# Patient Record
Sex: MALE | Race: WHITE | NOT HISPANIC OR LATINO | Employment: OTHER | URBAN - METROPOLITAN AREA
[De-identification: names, ages, dates, MRNs, and addresses within clinical notes are randomized per-mention and may not be internally consistent; named-entity substitution may affect disease eponyms.]

---

## 2018-10-09 ENCOUNTER — OFFICE VISIT (OUTPATIENT)
Dept: PULMONOLOGY | Facility: CLINIC | Age: 73
End: 2018-10-09

## 2018-10-09 ENCOUNTER — DOCUMENTATION (OUTPATIENT)
Dept: OTHER | Facility: HOSPITAL | Age: 73
End: 2018-10-09

## 2018-10-09 VITALS
WEIGHT: 169 LBS | BODY MASS INDEX: 24.2 KG/M2 | HEIGHT: 70 IN | HEART RATE: 60 BPM | OXYGEN SATURATION: 98 % | DIASTOLIC BLOOD PRESSURE: 78 MMHG | TEMPERATURE: 97.4 F | RESPIRATION RATE: 16 BRPM | SYSTOLIC BLOOD PRESSURE: 130 MMHG

## 2018-10-09 DIAGNOSIS — R06.02 SHORTNESS OF BREATH: Primary | ICD-10-CM

## 2018-10-09 DIAGNOSIS — J41.1 MUCOPURULENT CHRONIC BRONCHITIS (HCC): ICD-10-CM

## 2018-10-09 DIAGNOSIS — K21.9 GASTROESOPHAGEAL REFLUX DISEASE, ESOPHAGITIS PRESENCE NOT SPECIFIED: ICD-10-CM

## 2018-10-09 DIAGNOSIS — K44.9 HIATAL HERNIA: ICD-10-CM

## 2018-10-09 DIAGNOSIS — R91.8 MULTIPLE LUNG NODULES ON CT: ICD-10-CM

## 2018-10-09 PROCEDURE — 94726 PLETHYSMOGRAPHY LUNG VOLUMES: CPT | Performed by: INTERNAL MEDICINE

## 2018-10-09 PROCEDURE — 99204 OFFICE O/P NEW MOD 45 MIN: CPT | Performed by: INTERNAL MEDICINE

## 2018-10-09 PROCEDURE — 94729 DIFFUSING CAPACITY: CPT | Performed by: INTERNAL MEDICINE

## 2018-10-09 PROCEDURE — 94060 EVALUATION OF WHEEZING: CPT | Performed by: INTERNAL MEDICINE

## 2018-10-09 RX ORDER — TRIAMTERENE AND HYDROCHLOROTHIAZIDE 37.5; 25 MG/1; MG/1
1 TABLET ORAL DAILY
COMMUNITY
Start: 2018-09-26

## 2018-10-09 RX ORDER — RANITIDINE 150 MG/1
150 TABLET ORAL NIGHTLY
COMMUNITY
Start: 2018-07-11 | End: 2020-08-11 | Stop reason: ALTCHOICE

## 2018-10-09 RX ORDER — ALBUTEROL SULFATE 90 UG/1
4 AEROSOL, METERED RESPIRATORY (INHALATION) ONCE
Status: COMPLETED | OUTPATIENT
Start: 2018-10-09 | End: 2018-10-09

## 2018-10-09 RX ORDER — LOSARTAN POTASSIUM 100 MG/1
100 TABLET ORAL DAILY
COMMUNITY
Start: 2018-08-10

## 2018-10-09 RX ORDER — ALBUTEROL SULFATE 90 UG/1
2 AEROSOL, METERED RESPIRATORY (INHALATION) EVERY 4 HOURS PRN
Qty: 18 G | Refills: 5 | Status: SHIPPED | OUTPATIENT
Start: 2018-10-09 | End: 2020-08-11 | Stop reason: SDUPTHER

## 2018-10-09 RX ADMIN — ALBUTEROL SULFATE 4 PUFF: 90 AEROSOL, METERED RESPIRATORY (INHALATION) at 12:49

## 2018-10-09 NOTE — PROGRESS NOTES
"Initial Pulmonary Consult Note    Subjective   Reason for consultation: Lung nodules.     Williams Atkinson is a 73 y.o. male is being seen for consultation today at the request of Mesha Mchugh MD    History of Present Illness   72 YO here for an abnormal CT of the chest.  He had a physical recently and had a chest x-ray.  The chest x-ray showed a possible \"wire in his heart\" and a subsequent CT scan was done.  He is a former smoker who last quit in 2005.  He previously quit around 1975.  Overall, he estimates he smoked between 22-25 years.  The patient's CT scan showed a large upper lobe cavitary lesion as well as a liver lesion which was felt to be worrisome for metastasis.  Subsequent CT scan with contrast of the abdomen and pelvis revealed this to be consistent with a hemangioma.    The patient reports intermittent coughing which is productive of frothy sputum.  He feels it is related to \"choking on food\" and difficulty swallowing.  He denies fevers, chills, and hemoptysis.  He has a history of a hiatal hernia in the past.  He denies weight loss or night sweats.      The following portions of the patient's history were reviewed and updated as appropriate: allergies, current medications, past family history, past medical history, past social history, past surgical history and problem list.    Active Ambulatory Problems     Diagnosis Date Noted   • Mucopurulent chronic bronchitis (CMS/HCC) 10/09/2018   • Multiple lung nodules on CT 10/09/2018   • Shortness of breath 10/09/2018   • GERD (gastroesophageal reflux disease) 10/09/2018   • Hiatal hernia 10/09/2018     Resolved Ambulatory Problems     Diagnosis Date Noted   • No Resolved Ambulatory Problems     Past Medical History:   Diagnosis Date   • Colon cancer (CMS/HCC) 1998   • GERD (gastroesophageal reflux disease)    • Hiatal hernia    • Hypertension        Past Surgical History:   Procedure Laterality Date   • COLON SURGERY  1998   • HERNIA REPAIR   "       Family History   Problem Relation Age of Onset   • Hypertension Brother        Social History     Social History   • Marital status:      Spouse name: N/A   • Number of children: N/A   • Years of education: N/A     Occupational History   • Not on file.     Social History Main Topics   • Smoking status: Former Smoker     Packs/day: 1.00     Types: Cigars, Cigarettes     Quit date: 2005   • Smokeless tobacco: Never Used   • Alcohol use Yes   • Drug use: No   • Sexual activity: Defer     Other Topics Concern   • Not on file     Social History Narrative   • No narrative on file       No Known Allergies    Current Outpatient Prescriptions   Medication Sig Dispense Refill   • losartan (COZAAR) 100 MG tablet Take 100 mg by mouth Daily.     • raNITIdine (ZANTAC) 150 MG tablet Take 150 mg by mouth Every Night.     • triamterene-hydrochlorothiazide (MAXZIDE-25) 37.5-25 MG per tablet Take 1 tablet by mouth Daily.     • albuterol (PROVENTIL HFA;VENTOLIN HFA) 108 (90 Base) MCG/ACT inhaler Inhale 2 puffs Every 4 (Four) Hours As Needed for Wheezing. 18 g 5   • tiotropium bromide-olodaterol (STIOLTO RESPIMAT) 2.5-2.5 MCG/ACT aerosol solution inhaler Inhale 2 puffs Daily. 2 inh once a day 1 inhaler 5     No current facility-administered medications for this visit.        Review of Systems   Constitutional: Negative for activity change, appetite change, chills, diaphoresis, fatigue, fever and unexpected weight change.   HENT: Positive for hearing loss, postnasal drip, rhinorrhea, tinnitus and trouble swallowing. Negative for congestion, dental problem, ear pain, nosebleeds and sinus pressure.    Eyes: Positive for redness and itching. Negative for pain, discharge and visual disturbance.   Respiratory: Positive for cough, choking and wheezing. Negative for apnea, chest tightness, shortness of breath and stridor.    Cardiovascular: Negative for chest pain, palpitations and leg swelling.   Gastrointestinal: Negative for  "abdominal distention, abdominal pain, blood in stool, constipation, diarrhea, nausea and vomiting.   Endocrine: Positive for polyuria. Negative for cold intolerance, heat intolerance and polydipsia.   Genitourinary: Negative for dysuria, frequency, hematuria and urgency.   Musculoskeletal: Negative for arthralgias, joint swelling and myalgias.   Skin: Positive for rash. Negative for color change and wound.   Allergic/Immunologic: Negative for environmental allergies and immunocompromised state.   Neurological: Positive for dizziness. Negative for syncope, weakness, light-headedness, numbness and headaches.   Psychiatric/Behavioral: Negative for sleep disturbance and suicidal ideas.   All other systems reviewed and are negative.    All other systems were reviewed and are negative.  Exceptions are included in the HPI or as otherwise noted above.     Objective   Blood pressure 130/78, pulse 60, temperature 97.4 °F (36.3 °C), resp. rate 16, height 176.5 cm (69.5\"), weight 76.7 kg (169 lb), SpO2 98 %.  Physical Exam   Constitutional: He is oriented to person, place, and time. He appears well-developed and well-nourished.   HENT:   Head: Normocephalic and atraumatic.   Right Ear: External ear normal.   Left Ear: External ear normal.   Nose: Nose normal.   Mouth/Throat: Oropharynx is clear and moist. No oropharyngeal exudate.   Eyes: Pupils are equal, round, and reactive to light. Conjunctivae and EOM are normal. Right eye exhibits no discharge. Left eye exhibits no discharge. No scleral icterus.   Neck: Normal range of motion. Neck supple. No JVD present. No tracheal deviation present. No thyromegaly present.   Cardiovascular: Normal rate, regular rhythm, normal heart sounds and intact distal pulses.  Exam reveals no gallop and no friction rub.    No murmur heard.  Pulmonary/Chest: Effort normal. No accessory muscle usage or stridor. No apnea, no tachypnea and no bradypnea. No respiratory distress. He has no decreased " breath sounds. He has no wheezes. He has no rhonchi. He has no rales. Chest wall is not dull to percussion. He exhibits no tenderness, no bony tenderness, no crepitus and no deformity.   Abdominal: Soft. Bowel sounds are normal. He exhibits no distension. There is no tenderness.   Musculoskeletal: Normal range of motion. He exhibits no edema, tenderness or deformity.   Lymphadenopathy:     He has no cervical adenopathy.   Neurological: He is alert and oriented to person, place, and time. No cranial nerve deficit. He exhibits normal muscle tone. Coordination normal.   Skin: Skin is warm and dry. No rash noted. No erythema. No pallor.   Psychiatric: He has a normal mood and affect. His behavior is normal. Judgment and thought content normal.   Vitals reviewed.      PFTs:  Full pulmonary function testing was done today.  Please see scanned PFT report for details.    Interpretation: Severe obstruction with significant response to bronchodilator.  No restriction.  Low maximal voluntary ventilation.  No air trapping.  Normal DLCO.  No prior study available for comparison.  This study is consistent with stage II COPD.    Imaging:  CT scan of the chest from 8/16/2018 was reviewed.  It showed a foreign object within the vena cava extending into the right ventricle.  Additionally it showed a small (7.5 mm) left lower lobe nodule, and a small (8 mm) cavitary right upper lobe nodule.  There was a 3.5 cm lesion in the posterior right hepatic lobe of the liver which on subsequent contrast enhanced CT scan was consistent with a hemangioma.    Subsequent CT with contrast of abdomen and pelvis showed an enhancing hepatic lesions likely representing a hemangioma as well as an indeterminate 18 mm right mid pole kidney lesion.      Assessment/Plan   Problems Addressed this Visit        Respiratory    Mucopurulent chronic bronchitis (CMS/HCC)    Relevant Medications    tiotropium bromide-olodaterol (STIOLTO RESPIMAT) 2.5-2.5 MCG/ACT  aerosol solution inhaler    albuterol (PROVENTIL HFA;VENTOLIN HFA) 108 (90 Base) MCG/ACT inhaler    Multiple lung nodules on CT    Relevant Orders    CT Chest Without Contrast    Shortness of breath - Primary    Relevant Medications    albuterol (PROVENTIL HFA;VENTOLIN HFA) inhaler 4 puff (Completed)    Other Relevant Orders    Pulmonary Function Test (Completed)    Hiatal hernia    Relevant Medications    raNITIdine (ZANTAC) 150 MG tablet       Digestive    GERD (gastroesophageal reflux disease)    Relevant Medications    raNITIdine (ZANTAC) 150 MG tablet          Discussion:  73-year-old former smoker who is here for evaluation of lung nodules.  He has 2 separate subcentimeter lung nodules, one of which appears to be cavitary.    Evaluation today reveals moderate COPD.    The patient has a kidney lesion for which additional follow-up was recommended and I will defer this to the provider who ordered the CT scan of the abdomen and pelvis.    I feel the patient would benefit significantly from treatment of his COPD.  He may have an asthmatic component and I may consider addition of an inhaled steroid based on follow-up spirometry and symptoms.    The patient has had difficulty swallowing and should be evaluated by gastroenterology.  The patient desires to follow-up with his prior surgeon, however I will be happy to refer him to gastroenterology if necessary.    Plan:  CT scan in November. - Would follow for stability for 2 years, every 6 months after this initial scan.  Recommend GI follow up.   Start Stiolto.   Ventolin MDI.   Follow-up after repeat scan with spirometry.           I personally spent over half of a total 45 minutes face to face with the patient in counseling and discussion and/or coordination of care as described above.  This included review of images with the patient, discussion of differential diagnosis, discussion of follow-up diagnostic options, treatment options including demonstration of proper  use of inhalers.    Electronically signed by:  Ralf Plaza MD  10/09/18  7:06 PM

## 2018-10-11 NOTE — PROGRESS NOTES
Met patient and wife in lung nodule clinic with Dr. Plaza. He has history of colon cancer treated nearly 20years ago and intermittent smoking during his adult years totaling 23 years. In August he underwent CT chest and found to have 8mm RUL cavitary nodule. He denies hemoptysis, weight loss or new SOA. He did respond well to bronchodilators on PFT's and will be started on maintenance as well as rescue inhalers. Scans and PFT's reviewed. Per Dr. Plaza repeat CT late November with f/u OV same day due to distance he travels to [place]. He will continue to follow GI for colon cancer history. Introduced lung navigator role and provided contact information. He v/u and agreeable to plan. He knows to call with questions or concerns.

## 2018-11-16 ENCOUNTER — TRANSCRIBE ORDERS (OUTPATIENT)
Dept: ADMINISTRATIVE | Facility: HOSPITAL | Age: 73
End: 2018-11-16

## 2018-11-16 DIAGNOSIS — N28.89 RENAL MASS: Primary | ICD-10-CM

## 2018-11-26 ENCOUNTER — APPOINTMENT (OUTPATIENT)
Dept: CT IMAGING | Facility: HOSPITAL | Age: 73
End: 2018-11-26
Attending: INTERNAL MEDICINE

## 2018-12-06 ENCOUNTER — HOSPITAL ENCOUNTER (OUTPATIENT)
Dept: CT IMAGING | Facility: HOSPITAL | Age: 73
Discharge: HOME | End: 2018-12-06

## 2018-12-06 ENCOUNTER — HOSPITAL ENCOUNTER (OUTPATIENT)
Dept: CT IMAGING | Facility: HOSPITAL | Age: 73
Discharge: HOME | End: 2018-12-06
Attending: INTERNAL MEDICINE | Admitting: INTERNAL MEDICINE

## 2018-12-06 ENCOUNTER — OFFICE VISIT (OUTPATIENT)
Dept: PULMONOLOGY | Facility: CLINIC | Age: 73
End: 2018-12-06

## 2018-12-06 VITALS
BODY MASS INDEX: 25.48 KG/M2 | TEMPERATURE: 97.4 F | OXYGEN SATURATION: 96 % | SYSTOLIC BLOOD PRESSURE: 130 MMHG | HEART RATE: 64 BPM | RESPIRATION RATE: 16 BRPM | HEIGHT: 70 IN | DIASTOLIC BLOOD PRESSURE: 74 MMHG | WEIGHT: 178 LBS

## 2018-12-06 DIAGNOSIS — K44.9 HIATAL HERNIA: ICD-10-CM

## 2018-12-06 DIAGNOSIS — R91.8 MULTIPLE LUNG NODULES ON CT: ICD-10-CM

## 2018-12-06 DIAGNOSIS — N28.89 RENAL MASS: ICD-10-CM

## 2018-12-06 DIAGNOSIS — J41.1 MUCOPURULENT CHRONIC BRONCHITIS (HCC): Primary | ICD-10-CM

## 2018-12-06 DIAGNOSIS — R06.02 SHORTNESS OF BREATH: ICD-10-CM

## 2018-12-06 DIAGNOSIS — K21.9 GASTROESOPHAGEAL REFLUX DISEASE, ESOPHAGITIS PRESENCE NOT SPECIFIED: ICD-10-CM

## 2018-12-06 PROCEDURE — 99214 OFFICE O/P EST MOD 30 MIN: CPT | Performed by: INTERNAL MEDICINE

## 2018-12-06 PROCEDURE — 94060 EVALUATION OF WHEEZING: CPT | Performed by: INTERNAL MEDICINE

## 2018-12-06 PROCEDURE — 71250 CT THORAX DX C-: CPT

## 2018-12-06 PROCEDURE — 0 IOPAMIDOL PER 1 ML: Performed by: INTERNAL MEDICINE

## 2018-12-06 PROCEDURE — 82565 ASSAY OF CREATININE: CPT

## 2018-12-06 PROCEDURE — 74170 CT ABD WO CNTRST FLWD CNTRST: CPT

## 2018-12-06 RX ORDER — ALBUTEROL SULFATE 90 UG/1
4 AEROSOL, METERED RESPIRATORY (INHALATION) ONCE
Status: COMPLETED | OUTPATIENT
Start: 2018-12-06 | End: 2018-12-06

## 2018-12-06 RX ADMIN — IOPAMIDOL 95 ML: 755 INJECTION, SOLUTION INTRAVENOUS at 11:30

## 2018-12-06 RX ADMIN — ALBUTEROL SULFATE 4 PUFF: 90 AEROSOL, METERED RESPIRATORY (INHALATION) at 15:42

## 2018-12-06 NOTE — PROGRESS NOTES
"Pulmonary Follow-up Note    Subjective   Reason for consultation: Lung nodules.     Williams Atkinson Sr. is a 73 y.o. male is being seen for consultation today at the request of Mesha Mchugh MD.    History of Present Illness   73 y.o. here for an abnormal CT of the chest.  He had a physical recently and had a chest x-ray.  The chest x-ray showed a possible \"wire in his heart\" and a subsequent CT scan was done.  He is a former smoker who last quit in 2005.  He previously quit around 1975.  Overall, he estimates he smoked between 22-25 years.  The patient's CT scan showed a large upper lobe cavitary lesion as well as a liver lesion which was felt to be worrisome for metastasis.  Subsequent CT scan with contrast of the abdomen and pelvis revealed this to be consistent with a hemangioma.    The patient reports intermittent coughing which is productive of frothy sputum.  He feels it is related to \"choking on food\" and difficulty swallowing.  He denies fevers, chills, and hemoptysis.  He has a history of a hiatal hernia in the past.  He denies weight loss or night sweats.      Interval history:   The patient is here for follow-up today.  He reports he has not had an EGD yet.  He did have a CT scan of the chest, abdomen and pelvis today.  He feels that his breathing is improved on Stiolto.  He does not feel that he has significant allergies and he denies a history of childhood asthma.  No fevers, chills or hemoptysis.    The following portions of the patient's history were reviewed and updated as appropriate: allergies, current medications, past family history, past medical history, past social history, past surgical history and problem list.    No Known Allergies    Current Outpatient Medications   Medication Sig Dispense Refill   • albuterol (PROVENTIL HFA;VENTOLIN HFA) 108 (90 Base) MCG/ACT inhaler Inhale 2 puffs Every 4 (Four) Hours As Needed for Wheezing. 18 g 5   • losartan (COZAAR) 100 MG tablet Take 100 mg by " mouth Daily.     • raNITIdine (ZANTAC) 150 MG tablet Take 150 mg by mouth Every Night.     • tiotropium bromide-olodaterol (STIOLTO RESPIMAT) 2.5-2.5 MCG/ACT aerosol solution inhaler Inhale 2 puffs Daily. 2 inh once a day 1 inhaler 5   • triamterene-hydrochlorothiazide (MAXZIDE-25) 37.5-25 MG per tablet Take 1 tablet by mouth Daily.       No current facility-administered medications for this visit.        Review of Systems   Constitutional: Negative for activity change, appetite change, chills, diaphoresis, fatigue, fever and unexpected weight change.   HENT: Positive for tinnitus and trouble swallowing. Negative for congestion, dental problem, ear pain, nosebleeds, postnasal drip, rhinorrhea and sinus pressure.    Eyes: Positive for itching. Negative for pain, discharge, redness and visual disturbance.   Respiratory: Negative for apnea, cough, choking, chest tightness, shortness of breath, wheezing and stridor.    Cardiovascular: Negative for chest pain, palpitations and leg swelling.   Gastrointestinal: Negative for abdominal distention, abdominal pain, blood in stool, constipation, diarrhea, nausea and vomiting.   Endocrine: Positive for polyuria. Negative for cold intolerance, heat intolerance and polydipsia.   Genitourinary: Negative for dysuria, frequency, hematuria and urgency.   Musculoskeletal: Negative for arthralgias, joint swelling and myalgias.   Skin: Positive for rash. Negative for color change and wound.   Allergic/Immunologic: Negative for environmental allergies and immunocompromised state.   Neurological: Positive for dizziness. Negative for syncope, weakness, light-headedness, numbness and headaches.   Psychiatric/Behavioral: Negative for sleep disturbance and suicidal ideas.   All other systems reviewed and are negative.    All other systems were reviewed and are negative.  Exceptions are included in the HPI or as otherwise noted above.     Objective   Blood pressure 130/74, pulse 64,  "temperature 97.4 °F (36.3 °C), resp. rate 16, height 176.5 cm (69.5\"), weight 80.7 kg (178 lb), SpO2 96 %.  Physical Exam   Constitutional: He is oriented to person, place, and time. He appears well-developed and well-nourished.   HENT:   Head: Normocephalic and atraumatic.   Right Ear: External ear normal.   Left Ear: External ear normal.   Nose: Nose normal.   Mouth/Throat: Oropharynx is clear and moist. No oropharyngeal exudate.   Eyes: Conjunctivae and EOM are normal. Pupils are equal, round, and reactive to light. Right eye exhibits no discharge. Left eye exhibits no discharge. No scleral icterus.   Neck: Normal range of motion. Neck supple. No JVD present. No tracheal deviation present. No thyromegaly present.   Cardiovascular: Normal rate, regular rhythm, normal heart sounds and intact distal pulses. Exam reveals no gallop and no friction rub.   No murmur heard.  Pulmonary/Chest: Effort normal. No accessory muscle usage or stridor. No apnea, no tachypnea and no bradypnea. No respiratory distress. He has no decreased breath sounds. He has no wheezes. He has no rhonchi. He has no rales. Chest wall is not dull to percussion. He exhibits no tenderness, no bony tenderness, no crepitus and no deformity.   Abdominal: Soft. Bowel sounds are normal. He exhibits no distension. There is no tenderness.   Musculoskeletal: Normal range of motion. He exhibits no edema, tenderness or deformity.   Lymphadenopathy:     He has no cervical adenopathy.   Neurological: He is alert and oriented to person, place, and time. No cranial nerve deficit. He exhibits normal muscle tone. Coordination normal.   Skin: Skin is warm and dry. No rash noted. No erythema. No pallor.   Psychiatric: He has a normal mood and affect. His behavior is normal. Judgment and thought content normal.   Vitals reviewed.      PFTs:  Spirometry was done today.  Please see scanned PFT report for details.    Interpretation: Moderate obstruction with no " significant response to bronchodilator.  Low maximal voluntary ventilation.  No evidence of restriction.  Compared to prior study on 10/9/2018, FEV1 on prebronchodilator study is increased by 340 mL on today's study.  Postbronchodilator study showed an FEV1 of 1.56 L.    Imaging:  CT scan of the chest from 12/6/2018 was reviewed.  I reviewed both the images and the radiologist's report.  On my review, there has been no change in the tiny right apical cavitary nodule.  There is some scarring at the left lung base with atelectasis which is also stable.  Radiologist's impression follows:    IMPRESSION:   Stable nodule identified posteriorly within the right upper  lobe. There is also no change seen in the cystic structures within the  kidneys. There is possibly a proteinaceous cyst on the right kidney  which is also stable and unchanged. No new lesions are identified.  Continued followup recommended as indicated. No acute intrathoracic  abnormality is present. Stable atelectatic change is seen at the left  lung base.      CT scan of the chest from 8/16/2018 was reviewed.  It showed a foreign object within the vena cava extending into the right ventricle.  Additionally it showed a small (7.5 mm) left lower lobe nodule, and a small (8 mm) cavitary right upper lobe nodule.  There was a 3.5 cm lesion in the posterior right hepatic lobe of the liver which on subsequent contrast enhanced CT scan was consistent with a hemangioma.      Assessment/Plan   Problems Addressed this Visit        Respiratory    Mucopurulent chronic bronchitis (CMS/HCC) - Primary    Relevant Medications    albuterol (PROVENTIL HFA;VENTOLIN HFA;PROAIR HFA) inhaler 4 puff (Completed)    Other Relevant Orders    Spirometry With Bronchodilator    Spirometry With Bronchodilator (Completed)    Multiple lung nodules on CT    Relevant Orders    CT Chest Without Contrast    Shortness of breath    Hiatal hernia       Digestive    GERD (gastroesophageal reflux  disease)          Discussion:  73 y.o. former smoker who is here for evaluation of lung nodules.  He has 2 separate subcentimeter lung nodules, one of which appears to be cavitary.    Evaluation today reveals moderate COPD.  He previously had a significant bronchodilator response but today has no significant bronchodilator response.    The patient's breathing appears to be improved on Stiolto.  He is not having significant symptoms of asthma currently and had no significant bronchodilator response today.  I am going to continue his Stiolto.      The patient is still awaiting follow-up for his swallowing difficulty.    Plan:  Repeat CT scan without contrast in 6 months. - Would follow for stability for 2 years, every 6 months after this initial scan.  Recommend GI follow up.   Continue Stiolto.   Ventolin MDI rescue inhaler.   Follow-up after repeat scan with spirometry.       I personally spent over half of a total 30 minutes face to face with the patient in counseling and discussion and/or coordination of care as described above.  This included review of images with the patient, discussion of differential diagnosis, discussion of follow-up diagnostic options, treatment options including demonstration of proper use of inhalers.    Electronically signed by:  Ralf Plaza MD  12/07/18  2:51 PM

## 2018-12-11 LAB — CREAT BLDA-MCNC: 1.2 MG/DL (ref 0.6–1.3)

## 2019-01-07 DIAGNOSIS — K21.9 GASTROESOPHAGEAL REFLUX DISEASE, ESOPHAGITIS PRESENCE NOT SPECIFIED: Primary | ICD-10-CM

## 2019-06-12 ENCOUNTER — TELEPHONE (OUTPATIENT)
Dept: PULMONOLOGY | Facility: CLINIC | Age: 74
End: 2019-06-12

## 2019-06-12 NOTE — TELEPHONE ENCOUNTER
Called and spoke with patient to remind him to schedule his CT Chest ordered by Dr Plaza. [facility] had been unable to contact him. The number to [facility] was given to him so he could call and schedule.

## 2019-06-20 ENCOUNTER — HOSPITAL ENCOUNTER (OUTPATIENT)
Dept: CT IMAGING | Facility: HOSPITAL | Age: 74
Discharge: HOME | End: 2019-06-20
Admitting: INTERNAL MEDICINE

## 2019-06-20 DIAGNOSIS — R91.8 MULTIPLE LUNG NODULES ON CT: ICD-10-CM

## 2019-06-20 PROCEDURE — 71250 CT THORAX DX C-: CPT

## 2019-07-01 ENCOUNTER — TELEPHONE (OUTPATIENT)
Dept: PULMONOLOGY | Facility: CLINIC | Age: 74
End: 2019-07-01

## 2019-07-01 NOTE — TELEPHONE ENCOUNTER
Pt is wanting results of CT done on 6-20-19 an copies sent to Dr. Taniya Mchugh in [facility]. Please call [phone].

## 2019-09-17 ENCOUNTER — OFFICE VISIT (OUTPATIENT)
Dept: PULMONOLOGY | Facility: CLINIC | Age: 74
End: 2019-09-17

## 2019-09-17 VITALS
HEIGHT: 70 IN | OXYGEN SATURATION: 98 % | SYSTOLIC BLOOD PRESSURE: 110 MMHG | RESPIRATION RATE: 16 BRPM | HEART RATE: 54 BPM | DIASTOLIC BLOOD PRESSURE: 60 MMHG | WEIGHT: 174 LBS | BODY MASS INDEX: 24.91 KG/M2

## 2019-09-17 DIAGNOSIS — J43.2 CENTRILOBULAR EMPHYSEMA (HCC): ICD-10-CM

## 2019-09-17 DIAGNOSIS — R91.8 MULTIPLE LUNG NODULES ON CT: ICD-10-CM

## 2019-09-17 DIAGNOSIS — K44.9 HIATAL HERNIA: ICD-10-CM

## 2019-09-17 DIAGNOSIS — J41.1 MUCOPURULENT CHRONIC BRONCHITIS (HCC): ICD-10-CM

## 2019-09-17 DIAGNOSIS — R06.02 SHORTNESS OF BREATH: Primary | ICD-10-CM

## 2019-09-17 PROCEDURE — 94010 BREATHING CAPACITY TEST: CPT | Performed by: INTERNAL MEDICINE

## 2019-09-17 PROCEDURE — 99214 OFFICE O/P EST MOD 30 MIN: CPT | Performed by: INTERNAL MEDICINE

## 2019-09-17 RX ORDER — CETIRIZINE HYDROCHLORIDE 10 MG/1
TABLET ORAL
Refills: 5 | COMMUNITY
Start: 2019-09-12

## 2019-09-17 NOTE — PROGRESS NOTES
"Pulmonary Follow-up Note    Subjective   Reason for consultation: Lung nodules.     Williams Atkinson Sr. is a 74 y.o. male is being seen for consultation today at the request of Mesha Mchugh MD.    History of Present Illness   74 y.o. here for an abnormal CT of the chest.  Prior to his initial visit, he had a yearly physical and had a chest x-ray.  The chest x-ray showed a possible \"wire in his heart\" and a subsequent CT scan was done.  He is a former smoker who last quit in 2005.  He previously quit around 1975.  Overall, he estimates he smoked between 22-25 years.  The patient's CT scan showed a large upper lobe cavitary lesion as well as a liver lesion which was felt to be worrisome for metastasis.  Subsequent CT scan with contrast of the abdomen and pelvis revealed this to be consistent with a hemangioma.    He has a history of a hiatal hernia in the past.      Subsequent CT evaluation showed stability of findings.    Interval history:   The patient is here for follow-up today.  Patient had a recent repeat CT scan of the chest.  He reports some issues with feeling as if food is getting stuck in his throat.  No fevers or chills.  No nausea or vomiting.  No unintentional weight loss.  No night sweats.  No hemoptysis.  He has been using Stiolto.  He rarely uses rescue inhaler.  He does report some allergic rhinitis and was recently started on allergy medicine.  He feels that the antihistamine has helped with his allergic rhinitis.     The following portions of the patient's history were reviewed and updated as appropriate: allergies, current medications, past family history, past medical history, past social history, past surgical history and problem list.    No Known Allergies    Current Outpatient Medications   Medication Sig Dispense Refill   • albuterol (PROVENTIL HFA;VENTOLIN HFA) 108 (90 Base) MCG/ACT inhaler Inhale 2 puffs Every 4 (Four) Hours As Needed for Wheezing. 18 g 5   • cetirizine (zyrTEC) 10 MG " tablet TAKE 1 TABLET BY MOUTH ONCE DAILY FOR ALLERGIES  5   • losartan (COZAAR) 100 MG tablet Take 100 mg by mouth Daily.     • raNITIdine (ZANTAC) 150 MG tablet Take 150 mg by mouth Every Night.     • tiotropium bromide-olodaterol (STIOLTO RESPIMAT) 2.5-2.5 MCG/ACT aerosol solution inhaler Inhale 2 puffs Daily. 2 inh once a day 1 inhaler 11   • triamterene-hydrochlorothiazide (MAXZIDE-25) 37.5-25 MG per tablet Take 1 tablet by mouth Daily.       No current facility-administered medications for this visit.        Review of Systems   Constitutional: Negative for activity change, appetite change, chills, diaphoresis, fatigue, fever and unexpected weight change.   HENT: Positive for rhinorrhea and trouble swallowing. Negative for congestion, dental problem, ear pain, nosebleeds, postnasal drip, sinus pressure and tinnitus.    Eyes: Positive for itching. Negative for pain, discharge, redness and visual disturbance.   Respiratory: Positive for cough. Negative for apnea, choking, chest tightness, shortness of breath, wheezing and stridor.    Cardiovascular: Negative for chest pain, palpitations and leg swelling.   Gastrointestinal: Positive for constipation. Negative for abdominal distention, abdominal pain, blood in stool, diarrhea, nausea and vomiting.   Endocrine: Positive for cold intolerance. Negative for heat intolerance, polydipsia and polyuria.   Genitourinary: Negative for dysuria, frequency, hematuria and urgency.   Musculoskeletal: Negative for arthralgias, joint swelling and myalgias.   Skin: Positive for rash. Negative for color change and wound.   Allergic/Immunologic: Negative for environmental allergies and immunocompromised state.   Neurological: Positive for dizziness. Negative for syncope, weakness, light-headedness, numbness and headaches.   Psychiatric/Behavioral: Negative for sleep disturbance and suicidal ideas.   All other systems reviewed and are negative.    All other systems were reviewed and  "are negative.  Exceptions are included in the HPI or as otherwise noted above.     Objective   Blood pressure 110/60, pulse 54, resp. rate 16, height 176.5 cm (69.5\"), weight 78.9 kg (174 lb), SpO2 98 %.  Physical Exam   Constitutional: He is oriented to person, place, and time. He appears well-developed and well-nourished.   HENT:   Head: Normocephalic and atraumatic.   Right Ear: External ear normal.   Left Ear: External ear normal.   Nose: Nose normal.   Mouth/Throat: Oropharynx is clear and moist. No oropharyngeal exudate.   Eyes: Conjunctivae and EOM are normal. Pupils are equal, round, and reactive to light. Right eye exhibits no discharge. Left eye exhibits no discharge. No scleral icterus.   Neck: Normal range of motion. Neck supple. No JVD present. No tracheal deviation present. No thyromegaly present.   Cardiovascular: Normal rate, regular rhythm, normal heart sounds and intact distal pulses. Exam reveals no gallop and no friction rub.   No murmur heard.  Pulmonary/Chest: Effort normal. No accessory muscle usage or stridor. No apnea, no tachypnea and no bradypnea. No respiratory distress. He has no decreased breath sounds. He has no wheezes. He has no rhonchi. He has no rales. Chest wall is not dull to percussion. He exhibits no tenderness, no bony tenderness, no crepitus and no deformity.   Abdominal: Soft. Bowel sounds are normal. He exhibits no distension. There is no tenderness.   Musculoskeletal: Normal range of motion. He exhibits edema (trace bilateral LE). He exhibits no tenderness or deformity.   Lymphadenopathy:     He has no cervical adenopathy.   Neurological: He is alert and oriented to person, place, and time. No cranial nerve deficit. He exhibits normal muscle tone. Coordination normal.   Skin: Skin is warm and dry. No rash noted. No erythema. No pallor.   Psychiatric: He has a normal mood and affect. His behavior is normal. Judgment and thought content normal.   Vitals " reviewed.      PFTs:  Spirometry was done today.  Please see scanned PFT report for details.  FVC was 3.03 L or 75% predicted.  FEV1 was 1.50 L or 49% predicted.  FEV1 to FVC ratio 50%.    Interpretation: Severe obstruction.  Bronchodilator study not completed secondary to patient request.  Low maximal voluntary ventilation.  Compared to prior study on December 6, 2018, FEV1 is decreased by 80 mL.    Imaging:  CT of the chest without contrast from 6/20/2019 was reviewed.  I reviewed both the images and the radiologist report.  On my review, there is no significant change in the patient's lung nodules and chronic scarring.  The report was read as a small left pleural effusion which I feel is probably stable pleural thickening.  The radiologist impression follows:  IMPRESSION:  [                 ]  nodular scarring seen within the right  upper lobe. There are some chronic changes seen at the left lung base  with chronic tiny left pleural effusion. Small to moderate size hiatal   hernia. No acute parenchymal disease. Findings are stable.    CT scan of the chest from 12/6/2018 was reviewed.  I reviewed both the images and the radiologist's report.  On my review, there has been no change in the tiny right apical cavitary nodule.  There is some scarring at the left lung base with atelectasis which is also stable.  Radiologist's impression follows:  IMPRESSION:    IMPRESSION:   Stable nodule identified posteriorly within the right upper  lobe. There is also no change seen in the cystic structures within the  kidneys. There is possibly a proteinaceous cyst on the right kidney  which is also stable and unchanged. No new lesions are identified.  Continued followup recommended as indicated. No acute intrathoracic  abnormality is present. Stable atelectatic change is seen at the left  lung base.      CT scan of the chest from 8/16/2018 was reviewed.  It showed a foreign object within the vena cava extending into the right  ventricle.  Additionally it showed a small (7.5 mm) left lower lobe nodule, and a small (8 mm) cavitary right upper lobe nodule.  There was a 3.5 cm lesion in the posterior right hepatic lobe of the liver which on subsequent contrast enhanced CT scan was consistent with a hemangioma.      Assessment/Plan   Problems Addressed this Visit        Respiratory    Mucopurulent chronic bronchitis (CMS/HCC)    Relevant Medications    cetirizine (zyrTEC) 10 MG tablet    tiotropium bromide-olodaterol (STIOLTO RESPIMAT) 2.5-2.5 MCG/ACT aerosol solution inhaler    Multiple lung nodules on CT    Relevant Orders    CT Chest Without Contrast    Shortness of breath - Primary    Relevant Orders    Spirometry Without Bronchodilator (Completed)    Hiatal hernia    Centrilobular emphysema (CMS/HCC)    Relevant Medications    cetirizine (zyrTEC) 10 MG tablet    tiotropium bromide-olodaterol (STIOLTO RESPIMAT) 2.5-2.5 MCG/ACT aerosol solution inhaler    Other Relevant Orders    Spirometry Without Bronchodilator (Completed)          Discussion:  74 y.o. former smoker who is here for evaluation of lung nodules.  He has 2 separate subcentimeter lung nodules, one of which appears to be cavitary.    Evaluation today reveals moderate to severe COPD, minimally changed from most recent study.  He previously had a significant bronchodilator response but today has no significant bronchodilator response.    The patient's breathing appears to be improved on Stiolto.  He is not having significant symptoms of asthma currently and had no significant bronchodilator response on his last study (today's study did not include bronchodilator).  I am going to continue his Stiolto.      I encouraged the patient to be evaluated for his swallowing difficulties by gastroenterology, he is going to discuss this with his primary care provider.    Plan:  I will repeat a CT scan of the chest without contrast in June 2019.  If stable, he will not need additional  radiographic follow-up.  He also will be 15 years out from his last smoking at that point so should not need routine screening CT scan.  Recommend gastroenterology evaluation for swallowing difficulties.  I discussed this with the patient in detail and he will discuss it with his primary care provider.  Continue Stiolto, samples given.  Ventolin metered-dose inhaler rescue inhaler.  Encouraged continued avoidance of cigarette smoke.  Follow-up in 1 year with repeat spirometry and after repeat CT scan of the chest.  Patient will follow-up earlier if he develops any worsening of symptoms.       I personally spent over half of a total 28 minutes face to face with the patient in counseling and discussion and/or coordination of care as described above.  This included review of images with the patient, including comparison of current to prior images), discussion of differential diagnosis, discussion of follow-up diagnostic options including risks and benefits, treatment options including demonstration of proper use of inhalers.    Electronically signed by:  Ralf Plaza MD  09/17/19  4:33 PM

## 2020-06-22 ENCOUNTER — APPOINTMENT (OUTPATIENT)
Dept: CT IMAGING | Facility: HOSPITAL | Age: 75
End: 2020-06-22

## 2020-08-05 ENCOUNTER — APPOINTMENT (OUTPATIENT)
Dept: CT IMAGING | Facility: HOSPITAL | Age: 75
End: 2020-08-05

## 2020-08-11 ENCOUNTER — OFFICE VISIT (OUTPATIENT)
Dept: PULMONOLOGY | Facility: CLINIC | Age: 75
End: 2020-08-11

## 2020-08-11 ENCOUNTER — HOSPITAL ENCOUNTER (OUTPATIENT)
Dept: CT IMAGING | Facility: HOSPITAL | Age: 75
Discharge: HOME | End: 2020-08-11
Admitting: INTERNAL MEDICINE

## 2020-08-11 VITALS
OXYGEN SATURATION: 94 % | TEMPERATURE: 99.6 F | WEIGHT: 169 LBS | HEIGHT: 70 IN | SYSTOLIC BLOOD PRESSURE: 150 MMHG | BODY MASS INDEX: 24.2 KG/M2 | HEART RATE: 79 BPM | DIASTOLIC BLOOD PRESSURE: 74 MMHG

## 2020-08-11 DIAGNOSIS — J43.2 CENTRILOBULAR EMPHYSEMA (HCC): ICD-10-CM

## 2020-08-11 DIAGNOSIS — R06.02 SHORTNESS OF BREATH: ICD-10-CM

## 2020-08-11 DIAGNOSIS — R91.8 MULTIPLE LUNG NODULES ON CT: Primary | ICD-10-CM

## 2020-08-11 DIAGNOSIS — J41.1 MUCOPURULENT CHRONIC BRONCHITIS (HCC): ICD-10-CM

## 2020-08-11 DIAGNOSIS — K21.9 GASTROESOPHAGEAL REFLUX DISEASE, ESOPHAGITIS PRESENCE NOT SPECIFIED: ICD-10-CM

## 2020-08-11 DIAGNOSIS — R91.8 MULTIPLE LUNG NODULES ON CT: ICD-10-CM

## 2020-08-11 PROCEDURE — 99214 OFFICE O/P EST MOD 30 MIN: CPT | Performed by: NURSE PRACTITIONER

## 2020-08-11 PROCEDURE — 71250 CT THORAX DX C-: CPT

## 2020-08-11 RX ORDER — ALBUTEROL SULFATE 90 UG/1
2 AEROSOL, METERED RESPIRATORY (INHALATION) EVERY 4 HOURS PRN
Qty: 18 G | Refills: 5 | Status: SHIPPED | OUTPATIENT
Start: 2020-08-11

## 2020-08-11 RX ORDER — FAMOTIDINE 10 MG
10 TABLET ORAL DAILY
COMMUNITY

## 2020-08-11 NOTE — PROGRESS NOTES
"[facility] Pulmonary Follow up    CHIEF COMPLAINT    CT follow-up    HISTORY OF PRESENT ILLNESS    Williams Atkinson Sr. is a 75 y.o.male here today for a CT scan follow-up.  He was last seen in the office by Dr. Plaza in September 2019.  He denies any respiratory illnesses or exacerbations since his last appointment.    He was referred to our office for an abnormal CT scan.  He had a chest x-ray last year that showed a possible \" wire in his heart\" and a subsequent CT scan was done.  He had the wire removed from his heart.  His CT scan showed a large upper lobe cavitary lesion as well as a liver lesion which was felt to be worrisome for metastasis.  He had a follow-up CT scan abdomen and pelvis and the mass on his liver was consistent with a hemangioma.    He continues to use Stiolto 2 puffs daily for his COPD.  He has an albuterol rescue inhaler but does not use it on a regular basis.    He does have some minimal shortness of breath with activity but does recover quickly at rest.    He denies fever, chills, sputum production, hemoptysis, night sweats, weight loss, chest pain or palpitations.  He denies any lower extremity edema or calf tenderness.  He denies any sinus or allergy symptoms.  He will take over-the-counter medication as needed.  He denies reflux symptoms and is currently on Pepcid daily.    He is up-to-date on his current vaccinations.    Patient Active Problem List   Diagnosis   • Mucopurulent chronic bronchitis (CMS/HCC)   • Multiple lung nodules on CT   • Shortness of breath   • GERD (gastroesophageal reflux disease)   • Hiatal hernia   • Centrilobular emphysema (CMS/HCC)       No Known Allergies    Current Outpatient Medications:   •  cetirizine (zyrTEC) 10 MG tablet, TAKE 1 TABLET BY MOUTH ONCE DAILY FOR ALLERGIES, Disp: , Rfl: 5  •  famotidine (PEPCID) 10 MG tablet, Take 10 mg by mouth Daily., Disp: , Rfl:   •  losartan (COZAAR) 100 MG tablet, Take 100 mg by mouth Daily., Disp: , Rfl:   •  " tiotropium bromide-olodaterol (STIOLTO RESPIMAT) 2.5-2.5 MCG/ACT aerosol solution inhaler, Inhale 2 puffs Daily. 2 inh once a day, Disp: 1 inhaler, Rfl: 11  •  triamterene-hydrochlorothiazide (MAXZIDE-25) 37.5-25 MG per tablet, Take 1 tablet by mouth Daily., Disp: , Rfl:   •  albuterol sulfate  (90 Base) MCG/ACT inhaler, Inhale 2 puffs Every 4 (Four) Hours As Needed for Wheezing., Disp: 18 g, Rfl: 5  MEDICATION LIST AND ALLERGIES REVIEWED.    Social History     Tobacco Use   • Smoking status: Former Smoker     Packs/day: 1.00     Years: 15.00     Pack years: 15.00     Types: Cigars, Cigarettes     Last attempt to quit: 2005     Years since quitting: 15.6   • Smokeless tobacco: Never Used   Substance Use Topics   • Alcohol use: Yes   • Drug use: No       FAMILY AND SOCIAL HISTORY REVIEWED.    Review of Systems   Constitutional: Negative for activity change, appetite change, fatigue, fever and unexpected weight change.   HENT: Negative for congestion, postnasal drip, rhinorrhea, sinus pressure, sore throat and voice change.    Eyes: Negative for visual disturbance.   Respiratory: Positive for shortness of breath. Negative for cough, chest tightness and wheezing.    Cardiovascular: Negative for chest pain, palpitations and leg swelling.   Gastrointestinal: Negative for abdominal distention, abdominal pain, nausea and vomiting.   Endocrine: Negative for cold intolerance and heat intolerance.   Genitourinary: Negative for difficulty urinating and urgency.   Musculoskeletal: Negative for arthralgias, back pain and neck pain.   Skin: Negative for color change and pallor.   Allergic/Immunologic: Negative for environmental allergies and food allergies.   Neurological: Negative for dizziness, syncope, weakness and light-headedness.   Hematological: Negative for adenopathy. Does not bruise/bleed easily.   Psychiatric/Behavioral: Negative for agitation and behavioral problems.   .    /74 (BP Location: Right arm,  "Patient Position: Sitting, Cuff Size: Adult)   Pulse 79   Temp 99.6 °F (37.6 °C) (Temporal)   Ht 176.5 cm (69.5\")   Wt 76.7 kg (169 lb)   SpO2 94% Comment: room air seated  BMI 24.60 kg/m²       There is no immunization history on file for this patient.    Physical Exam   Constitutional: He is oriented to person, place, and time. He appears well-developed and well-nourished.   HENT:   Head: Normocephalic and atraumatic.   Eyes: Pupils are equal, round, and reactive to light.   Neck: Normal range of motion. Neck supple. No thyromegaly present.   Cardiovascular: Normal rate, regular rhythm, normal heart sounds and intact distal pulses. Exam reveals no gallop and no friction rub.   No murmur heard.  Pulmonary/Chest: Effort normal and breath sounds normal. No respiratory distress. He has no wheezes. He has no rales. He exhibits no tenderness.   Abdominal: Soft. Bowel sounds are normal. There is no tenderness.   Musculoskeletal: Normal range of motion. He exhibits no edema.   Lymphadenopathy:     He has no cervical adenopathy.   Neurological: He is alert and oriented to person, place, and time.   Skin: Skin is warm and dry. Capillary refill takes less than 2 seconds. He is not diaphoretic.   Psychiatric: He has a normal mood and affect. His behavior is normal.   Nursing note and vitals reviewed.        RESULTS    Ct Chest Without Contrast    Result Date: 8/11/2020  Stable nodular scarring right lung apex with stable noncalcified pulmonary nodule left lung base adjacent atelectasis and trace left pleural effusion measuring 6 mm stable dating back to 08/16/2018 outside imaging examination with lung RADS category 2. Recommended annual followup utilizing low-dose CT screening.  D:  08/11/2020 E:  08/11/2020  This report was finalized on 8/11/2020 12:38 PM by Dr. Jignesh Pena.      PROBLEM LIST    Problem List Items Addressed This Visit        Respiratory    Mucopurulent chronic bronchitis (CMS/HCC)    Relevant " Medications    tiotropium bromide-olodaterol (STIOLTO RESPIMAT) 2.5-2.5 MCG/ACT aerosol solution inhaler    albuterol sulfate  (90 Base) MCG/ACT inhaler    Multiple lung nodules on CT - Primary    Shortness of breath    Centrilobular emphysema (CMS/HCC)    Relevant Medications    tiotropium bromide-olodaterol (STIOLTO RESPIMAT) 2.5-2.5 MCG/ACT aerosol solution inhaler    albuterol sulfate  (90 Base) MCG/ACT inhaler       Digestive    GERD (gastroesophageal reflux disease)    Relevant Medications    famotidine (PEPCID) 10 MG tablet            DISCUSSION    Mr. Atkinson was here for follow-up of his CT scan that he had done earlier today.  We did review the CT scan and it did show stable nodule in the left lung base and stable scar in the right lung apex.  He has a remote smoking history and does not meet qualifications for low-dose CT screenings.  We have followed this nodule since August 2018.  He does no longer require screening for this nodule.    He will continue to use Stiolto 2 puffs daily.  I did send in refills for this today.  He will continue to use his albuterol as needed before heavy exertion.  I did encourage him to use his albuterol more frequently for shortness of breath.    He will continue Pepcid daily for GERD.  We also discussed reflux precautions in the office today such as elevating the head of the bed at night, not eating 2 to 3 hours before bed and avoiding foods that trigger reflux symptoms.    He will follow-up yearly or sooner if his symptoms worsen.  He will call with any additional concerns or questions.  I spent 25 minutes with the patient. I spent > 50% percent of this time counseling and discussing diagnosis, prognosis, diagnostic testing, evaluation, current status, treatment options and management.    CHETAN Boswell  08/11/20201:49 PM  Electronically signed     Please note that portions of this note were completed with a voice recognition program. Efforts were made  to edit the dictations, but occasionally words are mistranscribed.      CC: Mesha Mchugh MD

## 2020-08-12 DIAGNOSIS — R91.8 MULTIPLE LUNG NODULES ON CT: Primary | ICD-10-CM

## 2021-08-11 ENCOUNTER — APPOINTMENT (OUTPATIENT)
Dept: CT IMAGING | Facility: HOSPITAL | Age: 76
End: 2021-08-11

## 2021-10-07 ENCOUNTER — APPOINTMENT (OUTPATIENT)
Dept: CT IMAGING | Facility: HOSPITAL | Age: 76
End: 2021-10-07

## 2021-10-22 DIAGNOSIS — J43.2 CENTRILOBULAR EMPHYSEMA (HCC): ICD-10-CM

## 2021-10-22 DIAGNOSIS — J41.1 MUCOPURULENT CHRONIC BRONCHITIS (HCC): ICD-10-CM

## 2021-10-22 RX ORDER — TIOTROPIUM BROMIDE AND OLODATEROL 3.124; 2.736 UG/1; UG/1
SPRAY, METERED RESPIRATORY (INHALATION)
Qty: 4 G | Refills: 0 | OUTPATIENT
Start: 2021-10-22

## 2021-10-27 DIAGNOSIS — J41.1 MUCOPURULENT CHRONIC BRONCHITIS (HCC): ICD-10-CM

## 2021-10-27 DIAGNOSIS — J43.2 CENTRILOBULAR EMPHYSEMA (HCC): ICD-10-CM

## 2021-10-27 RX ORDER — TIOTROPIUM BROMIDE AND OLODATEROL 3.124; 2.736 UG/1; UG/1
SPRAY, METERED RESPIRATORY (INHALATION)
Qty: 4 G | Refills: 0 | Status: SHIPPED | OUTPATIENT
Start: 2021-10-27

## 2025-04-07 NOTE — PROGRESS NOTES
Cardiology Follow-Up Note     Name: Williams Atkinson Sr.  :   1945  PCP: Mesha Mchugh MD  Date:   2025  Department: [place] [facility] CARDIOLOGY  [address]  [facility] [address]  Fax [phone]  Phone [phone]    Chief Complaint   Patient presents with    Hyperlipidemia    Hypertension     Subjective     History of Present Illness  Williams Atkinson Sr. is a 80 y.o. male who presents today for 3-month follow-up.  Doing okay, no chest pain or shortness of breath.    Hypertension  Hyperlipidemia  Moderate MR  Mild to moderate aortic regurgitation  Echo 2024: EF 58%, mild LVH, mild to moderate AI, moderate MR, mild TR  5.  2020 pulmonary embolism    Past Medical History:   Diagnosis Date    Aortic insufficiency     mild to moderate by echo    Aortic regurgitation     mild to moderate by echo    Cancer     Colon cancer     COPD (chronic obstructive pulmonary disease)     GERD (gastroesophageal reflux disease)     GERD with apnea     Hiatal hernia     Hypertension     Hypertension     Mitral regurgitation     moderate by echo    Pulmonary insufficiency     mild by echo    Pulmonic regurgitation     mild by echo    Scarlet fever     Sleep apnea     Tricuspid regurgitation     mild by echo      Past Surgical History:   Procedure Laterality Date    COLECTOMY PARTIAL / TOTAL      COLON SURGERY      HERNIA REPAIR      IR ANGIOGRAM PULMONARY SELECTIVE UNILATERAL  10/14/2020    sje mq iop: submassive pulmonary emboli bilaterally. successful placement of catheter- directed thrombolysis catheters in both right and left pulmonary arteries       Current Outpatient Medications:     albuterol sulfate  (90 Base) MCG/ACT inhaler, Inhale 2 puffs Every 4 (Four) Hours As Needed for Wheezing., Disp: 18 g, Rfl: 5    aspirin 81 MG EC tablet, Take 1 tablet by mouth Daily., Disp: , Rfl:     carvedilol (COREG) 3.125 MG tablet, Take  "1 tablet by mouth Every 12 (Twelve) Hours., Disp: , Rfl:     cetirizine (zyrTEC) 10 MG tablet, TAKE 1 TABLET BY MOUTH ONCE DAILY FOR ALLERGIES, Disp: , Rfl: 5    empagliflozin (Jardiance) 10 MG tablet tablet, Take 1 tablet by mouth Daily., Disp: , Rfl:     famotidine (PEPCID) 10 MG tablet, Take 1 tablet by mouth Daily. (Patient taking differently: Take 2 tablets by mouth 2 (Two) Times a Day As Needed.), Disp: , Rfl:     furosemide (LASIX) 40 MG tablet, Take 1 tablet by mouth Daily., Disp: , Rfl:     lisinopril (PRINIVIL,ZESTRIL) 20 MG tablet, Take 1 tablet by mouth 2 (Two) Times a Day., Disp: , Rfl:     potassium chloride 10 MEQ CR tablet, Take 1 tablet by mouth Daily., Disp: , Rfl:     rivaroxaban (Xarelto) 20 MG tablet, Take 1 tablet by mouth Daily. (Patient taking differently: Take 0.5 tablets by mouth Daily.), Disp: , Rfl:     rosuvastatin (CRESTOR) 10 MG tablet, Take 1 tablet by mouth Daily., Disp: , Rfl:     losartan (COZAAR) 100 MG tablet, Take 100 mg by mouth Daily. (Patient not taking: Reported on 4/9/2025), Disp: , Rfl:     Stiolto Respimat 2.5-2.5 MCG/ACT aerosol solution inhaler, INHALE 2 PUFFS BY MOUTH ONCE DAILY (Patient not taking: Reported on 4/9/2025), Disp: 4 g, Rfl: 0    triamterene-hydrochlorothiazide (MAXZIDE-25) 37.5-25 MG per tablet, Take 1 tablet by mouth Daily. (Patient not taking: Reported on 4/9/2025), Disp: , Rfl:     Objective     Vital Signs:  /81 (BP Location: Left arm, Patient Position: Sitting, Cuff Size: Adult)   Pulse 60   Ht 175.3 cm (69\")   Wt 65.3 kg (144 lb)   BMI 21.27 kg/m²   Estimated body mass index is 21.27 kg/m² as calculated from the following:    Height as of this encounter: 175.3 cm (69\").    Weight as of this encounter: 65.3 kg (144 lb).             Vitals reviewed.   Constitutional:       Appearance: Normal and healthy appearance.   Eyes:      Pupils: Pupils are equal, round, and reactive to light.   Pulmonary:      Effort: Pulmonary effort is normal. " "  Chest:      Chest wall: Not tender to palpatation.   Cardiovascular:      PMI at left midclavicular line. Normal rate. Regular rhythm.      Murmurs: There is a systolic murmur.      No gallop.    Pulses:     Intact distal pulses.   Edema:     Peripheral edema absent.   Skin:     General: Skin is warm.   Psychiatric:         Behavior: Behavior is cooperative.              Data Review:   Lab Results   Component Value Date    GLUCOSE 106 (H) 07/27/2023    CREATININE 1.20 12/06/2018    EGFRIFAFRI 38 09/18/2024    K 3.9 07/27/2023     No results found for: \"CHOL\", \"CHLPL\", \"TRIG\", \"HDL\", \"LDL\", \"LDLDIRECT\"   Lab Results   Component Value Date    WBC 9.18 07/28/2023    RBC 3.32 (L) 07/28/2023    HGB 10.1 (L) 07/28/2023    HCT 30.6 (L) 07/28/2023    MCV 92 07/28/2023     07/28/2023     No results found for: \"TSH\"  No results found for: \"HGBA1C\"  Lab Results   Component Value Date    INR 1.12 (H) 10/22/2023    PROTIME 11.8 (H) 10/22/2023       Labs dated 4/7/2025.  BMP revealed GFR 49, potassium 5.3.  LFTs normal, LDL 54, HDL 60, triglyceride 56.  CBC-OK.      10/24/2022 which reveals hemoglobin 12.8 hematocrit 38.6 platelets 182 LDL 51 LFTs normal and BMP revealed GFR of 38.    Assessment and Plan     Assessment & Plan  Benign essential hypertension  Hypertension is stable and controlled  Continue current treatment regimen.  Dietary sodium restriction.  Regular aerobic exercise.  Ambulatory blood pressure monitoring.  Blood pressure will be reassessed in 1 month.  The patient has CKD stage IIIa by order labs, patient had stage III kidney disease secondary to hypertension.  Patient understand that labs need to be checked every 3 to 6 months.  The continue lisinopril 20 mg twice a day.  Carvedilol 3.125 mg twice a day.  Patient is not on triamterene hydrochlorothiazide.  Orders:    Basic Metabolic Panel; Future    Hyperlipidemia LDL goal <70   Lipid abnormalities are stable    Plan:  Continue same medication/s " without change.      Discussed medication dosage, use, side effects, and goals of treatment in detail.    Counseled patient on lifestyle modifications to help control hyperlipidemia.   Advised patient to exercise for 150 minutes weekly. (30 minute brisk walk, 5 days a week for example)    Patient Treatment Goals:   LDL goal is less than 70    Followup in 4 weeks.  Continue rosuvastatin 10 mg once a day, goal is to keep the LDL less than 70.  Orders:    Basic Metabolic Panel; Future    Nonrheumatic mitral valve regurgitation  The patient understand will need an echo every year for evaluation of regurgitant valves.  The symptoms may come to late.  Orders:    Basic Metabolic Panel; Future    Hyperkalemia  The patient potassium level is elevated, this could be secondary to combination of the medication as well as patient is taking extra potassium pills.  I advised him to stop taking extra potassium pill will repeat the lab in 4 weeks.  Orders:    Basic Metabolic Panel; Future    Multiple subsegmental pulmonary emboli without acute cor pulmonale  The patient is having a lot of bruising on his skin, we will therefore discontinue aspirin continue Xarelto 10 mg once a day.  Orders:    Basic Metabolic Panel; Future      Advised to continue current cardiac medications. Please notify of any issues. Discussed with the patient compliance with medical management and follow-up.     Follow Up  Return in about 4 weeks (around 5/7/2025).    Call if you have any significant symptoms or go to the [facility] if possible.     Bulmaro Perry MD, FACC,[facility].  [place] [facility]    Part of this note may be an electronic transcription/translation of spoken language to printed text using the Dragon Dictation System.

## 2025-04-08 PROBLEM — E78.5 HYPERLIPIDEMIA LDL GOAL <70: Status: ACTIVE | Noted: 2025-04-08

## 2025-04-08 PROBLEM — I34.0 NONRHEUMATIC MITRAL VALVE REGURGITATION: Status: ACTIVE | Noted: 2025-04-08

## 2025-04-08 PROBLEM — I10 BENIGN ESSENTIAL HYPERTENSION: Status: ACTIVE | Noted: 2025-04-08

## 2025-04-08 RX ORDER — FUROSEMIDE 40 MG/1
40 TABLET ORAL DAILY
COMMUNITY

## 2025-04-08 RX ORDER — ROSUVASTATIN CALCIUM 10 MG/1
10 TABLET, COATED ORAL DAILY
COMMUNITY

## 2025-04-08 RX ORDER — ASPIRIN 81 MG/1
81 TABLET ORAL DAILY
COMMUNITY
End: 2025-04-09

## 2025-04-08 RX ORDER — CARVEDILOL 3.12 MG/1
3.12 TABLET ORAL EVERY 12 HOURS
COMMUNITY

## 2025-04-08 RX ORDER — POTASSIUM CHLORIDE 750 MG/1
10 TABLET, EXTENDED RELEASE ORAL DAILY
COMMUNITY

## 2025-04-08 NOTE — ASSESSMENT & PLAN NOTE
The patient understand will need an echo every year for evaluation of regurgitant valves.  The symptoms may come to late.  Orders:    Basic Metabolic Panel; Future

## 2025-04-08 NOTE — ASSESSMENT & PLAN NOTE
Lipid abnormalities are stable    Plan:  Continue same medication/s without change.      Discussed medication dosage, use, side effects, and goals of treatment in detail.    Counseled patient on lifestyle modifications to help control hyperlipidemia.   Advised patient to exercise for 150 minutes weekly. (30 minute brisk walk, 5 days a week for example)    Patient Treatment Goals:   LDL goal is less than 70    Followup in 4 weeks.  Continue rosuvastatin 10 mg once a day, goal is to keep the LDL less than 70.  Orders:    Basic Metabolic Panel; Future

## 2025-04-08 NOTE — ASSESSMENT & PLAN NOTE
Hypertension is stable and controlled  Continue current treatment regimen.  Dietary sodium restriction.  Regular aerobic exercise.  Ambulatory blood pressure monitoring.  Blood pressure will be reassessed in 1 month.  The patient has CKD stage IIIa by order labs, patient had stage III kidney disease secondary to hypertension.  Patient understand that labs need to be checked every 3 to 6 months.  The continue lisinopril 20 mg twice a day.  Carvedilol 3.125 mg twice a day.  Patient is not on triamterene hydrochlorothiazide.  Orders:    Basic Metabolic Panel; Future

## 2025-04-09 ENCOUNTER — OFFICE VISIT (OUTPATIENT)
Age: 80
End: 2025-04-09
Payer: MEDICARE

## 2025-04-09 VITALS
HEART RATE: 60 BPM | SYSTOLIC BLOOD PRESSURE: 135 MMHG | WEIGHT: 144 LBS | DIASTOLIC BLOOD PRESSURE: 81 MMHG | BODY MASS INDEX: 21.33 KG/M2 | HEIGHT: 69 IN

## 2025-04-09 DIAGNOSIS — E87.5 HYPERKALEMIA: ICD-10-CM

## 2025-04-09 DIAGNOSIS — I34.0 NONRHEUMATIC MITRAL VALVE REGURGITATION: ICD-10-CM

## 2025-04-09 DIAGNOSIS — I10 BENIGN ESSENTIAL HYPERTENSION: Primary | ICD-10-CM

## 2025-04-09 DIAGNOSIS — I26.94 MULTIPLE SUBSEGMENTAL PULMONARY EMBOLI WITHOUT ACUTE COR PULMONALE: ICD-10-CM

## 2025-04-09 DIAGNOSIS — E78.5 HYPERLIPIDEMIA LDL GOAL <70: ICD-10-CM

## 2025-04-09 PROBLEM — I26.99 PULMONARY EMBOLISM: Status: ACTIVE | Noted: 2025-04-09

## 2025-04-09 PROCEDURE — 3075F SYST BP GE 130 - 139MM HG: CPT | Performed by: INTERNAL MEDICINE

## 2025-04-09 PROCEDURE — G2211 COMPLEX E/M VISIT ADD ON: HCPCS | Performed by: INTERNAL MEDICINE

## 2025-04-09 PROCEDURE — 3079F DIAST BP 80-89 MM HG: CPT | Performed by: INTERNAL MEDICINE

## 2025-04-09 PROCEDURE — 99214 OFFICE O/P EST MOD 30 MIN: CPT | Performed by: INTERNAL MEDICINE

## 2025-04-09 RX ORDER — LISINOPRIL 20 MG/1
20 TABLET ORAL 2 TIMES DAILY
COMMUNITY

## 2025-04-09 NOTE — ASSESSMENT & PLAN NOTE
The patient is having a lot of bruising on his skin, we will therefore discontinue aspirin continue Xarelto 10 mg once a day.  Orders:    Basic Metabolic Panel; Future

## 2025-04-09 NOTE — ASSESSMENT & PLAN NOTE
The patient potassium level is elevated, this could be secondary to combination of the medication as well as patient is taking extra potassium pills.  I advised him to stop taking extra potassium pill will repeat the lab in 4 weeks.  Orders:    Basic Metabolic Panel; Future

## 2025-05-06 RX ORDER — ASPIRIN 81 MG/1
81 TABLET, CHEWABLE ORAL DAILY
COMMUNITY
End: 2025-05-07

## 2025-05-06 NOTE — PROGRESS NOTES
Cardiology Follow-Up Note     Name: Williams Atkinson Sr.  :   1945  PCP: Mesha Mchugh MD  Date:   2025  Department: [facility] [place] [facility] CARDIOLOGY  [address]  [facility] [address]  Fax [phone]  Phone [phone]    Chief Complaint   Patient presents with    Hyperlipidemia    Hypertension       Subjective     History of Present Illness  Williams Atkinson Sr. is a 80 y.o. male who presents today for 4 week follow up regarding blood pressure and hyperkalemia.  The patient labs revealed that the potassium has improved.  No chest pain or shortness of breath.    Problem List:  Hypertension  Hyperlipidemia  Moderate MR  Mild to moderate aortic regurgitation  Echo 2024: EF 58%, mild LVH, mild to moderate AI, moderate MR, mild TR  5.  History of pulmonary embolism     Past Medical History:   Diagnosis Date    Aortic insufficiency     mild to moderate by echo    Aortic regurgitation     mild to moderate by echo    Cancer     Colon cancer     COPD (chronic obstructive pulmonary disease)     GERD (gastroesophageal reflux disease)     GERD with apnea     Hiatal hernia     Hypertension     Hypertension     Mitral regurgitation     moderate by echo    Pulmonary insufficiency     mild by echo    Pulmonic regurgitation     mild by echo    Scarlet fever     Sleep apnea     Tricuspid regurgitation     mild by echo      Past Surgical History:   Procedure Laterality Date    COLECTOMY PARTIAL / TOTAL      COLON SURGERY      HERNIA REPAIR      IR ANGIOGRAM PULMONARY SELECTIVE UNILATERAL  10/14/2020    sje mq iop: submassive pulmonary emboli bilaterally. successful placement of catheter- directed thrombolysis catheters in both right and left pulmonary arteries       Current Outpatient Medications:     albuterol sulfate  (90 Base) MCG/ACT inhaler, Inhale 2 puffs Every 4 (Four) Hours As Needed for Wheezing., Disp: 18 g, Rfl: 5     "carvedilol (COREG) 3.125 MG tablet, Take 1 tablet by mouth Every 12 (Twelve) Hours., Disp: , Rfl:     cetirizine (zyrTEC) 10 MG tablet, TAKE 1 TABLET BY MOUTH ONCE DAILY FOR ALLERGIES, Disp: , Rfl: 5    empagliflozin (Jardiance) 10 MG tablet tablet, Take 1 tablet by mouth Daily., Disp: , Rfl:     famotidine (PEPCID) 10 MG tablet, Take 1 tablet by mouth Daily. (Patient taking differently: Take 2 tablets by mouth 2 (Two) Times a Day As Needed.), Disp: , Rfl:     furosemide (LASIX) 40 MG tablet, Take 1 tablet by mouth Daily., Disp: , Rfl:     lisinopril (PRINIVIL,ZESTRIL) 20 MG tablet, Take 1 tablet by mouth 2 (Two) Times a Day., Disp: , Rfl:     rivaroxaban (XARELTO) 10 MG tablet, Take 1 tablet by mouth Daily., Disp: , Rfl:     rosuvastatin (CRESTOR) 10 MG tablet, Take 1 tablet by mouth Daily., Disp: , Rfl:     Objective     Vital Signs:  /68 (BP Location: Left arm, Patient Position: Sitting, Cuff Size: Adult)   Pulse 52   Ht 175.3 cm (69\")   Wt 64.4 kg (142 lb)   BMI 20.97 kg/m²   Estimated body mass index is 20.97 kg/m² as calculated from the following:    Height as of this encounter: 175.3 cm (69\").    Weight as of this encounter: 64.4 kg (142 lb).       BMI is within normal parameters. No other follow-up for BMI required.      Vitals reviewed.   Constitutional:       Appearance: Normal and healthy appearance.   Eyes:      Pupils: Pupils are equal, round, and reactive to light.   Pulmonary:      Effort: Pulmonary effort is normal.   Chest:      Chest wall: Not tender to palpatation.   Cardiovascular:      PMI at left midclavicular line. Normal rate. Regular rhythm.      No gallop.    Pulses:     Intact distal pulses.   Edema:     Peripheral edema absent.   Skin:     General: Skin is warm.   Psychiatric:         Behavior: Behavior is cooperative.              Data Review:   Lab Results   Component Value Date    GLUCOSE 106 (H) 07/27/2023    CREATININE 1.20 12/06/2018    EGFRIFAFRI 38 09/18/2024    K 3.9 " "07/27/2023     No results found for: \"CHOL\", \"CHLPL\", \"TRIG\", \"HDL\", \"LDL\", \"LDLDIRECT\"   Lab Results   Component Value Date    WBC 9.18 07/28/2023    RBC 3.32 (L) 07/28/2023    HGB 10.1 (L) 07/28/2023    HCT 30.6 (L) 07/28/2023    MCV 92 07/28/2023     07/28/2023     No results found for: \"TSH\"  No results found for: \"HGBA1C\"  Lab Results   Component Value Date    INR 1.12 (H) 10/22/2023    PROTIME 11.8 (H) 10/22/2023       Labs dated 4/7/2025 BMP revealed creatinine 1.46 potassium 5.3 GFR 49, LDL 54.  CBC normal  Labs reviewed 5/6/2025 potassium 4.3 GFR 49.  Assessment and Plan     Assessment & Plan  Benign essential hypertension  Hypertension is stable and controlled.  The  potassium number is improved.  Patient is off potassium.  We also discontinued aspirin.  Continue current treatment regimen.  Dietary sodium restriction.  Weight loss.  Ambulatory blood pressure monitoring.  Blood pressure will be reassessed in 3 months.  Continue carvedilol 3.125 mg twice a day and lisinopril 20 mg once a day.    Discussed with patient American College of cardiology and American Heart Association provide detailed guidelines for accurate blood pressure measurement.  Key steps for proper blood pressure measurement include:    Recommend being fully relaxed sitting in chair with feet on the floor and back supported for at least 5 minutes.  Avoid caffeine, exercise and smoking for at least 30 minutes before management.  Ensure empty bladder, remove clothing covering the location of the cuff placement using proper blood pressure equipment and the blood pressure cuff size should be corrected with bladder encircling 80% of the arm.  Repeat blood pressure if blood pressure is extremely high.  The ideal blood pressure is less than 120/80 on the average blood pressure should be less than 130/80.  The patient will bring blood pressure log next time.        Orders:    CBC & Differential; Future    Basic Metabolic Panel; Future    " Lipid Panel; Future    Hyperlipidemia LDL goal <70   Lipid abnormalities are stable    Plan:  Continue same medication/s without change.      Discussed medication dosage, use, side effects, and goals of treatment in detail.    Counseled patient on lifestyle modifications to help control hyperlipidemia.   Advised patient to exercise for 150 minutes weekly. (30 minute brisk walk, 5 days a week for example)    Patient Treatment Goals:   LDL goal is less than 70    Followup in 3 months.  Continue rosuvastatin 10 mg once a day goal is to keep the LDL less than 70 ideally less than 55.  Orders:    CBC & Differential; Future    Basic Metabolic Panel; Future    Lipid Panel; Future    Nonrheumatic mitral valve regurgitation  The patient has mitral regurgitation and aortic regurgitation we will repeat the echo in December.  Orders:    CBC & Differential; Future    Basic Metabolic Panel; Future    Lipid Panel; Future    Chronic pulmonary embolism without acute cor pulmonale, unspecified pulmonary embolism type  Continue Xarelto 10 mg once a day.  The patient is getting this for chronic therapy for unprovoked DVT/pulmonary embolism.  Orders:    CBC & Differential; Future    Basic Metabolic Panel; Future    Lipid Panel; Future      Advised to continue current cardiac medications. Please notify of any issues. Discussed with the patient compliance with medical management and follow-up.     Follow Up  Return in about 6 months (around 11/7/2025).    Call if you have any significant symptoms or go to the [facility] room if possible.     Bulmaro Perry MD, FACC,[facility].  [place] [facility]    Part of this note may be an electronic transcription/translation of spoken language to printed text using the Dragon Dictation System.

## 2025-05-06 NOTE — ASSESSMENT & PLAN NOTE
Hypertension is stable and controlled.  The  potassium number is improved.  Patient is off potassium.  We also discontinued aspirin.  Continue current treatment regimen.  Dietary sodium restriction.  Weight loss.  Ambulatory blood pressure monitoring.  Blood pressure will be reassessed in 3 months.  Continue carvedilol 3.125 mg twice a day and lisinopril 20 mg once a day.    Discussed with patient American College of cardiology and American Heart Association provide detailed guidelines for accurate blood pressure measurement.  Key steps for proper blood pressure measurement include:    Recommend being fully relaxed sitting in chair with feet on the floor and back supported for at least 5 minutes.  Avoid caffeine, exercise and smoking for at least 30 minutes before management.  Ensure empty bladder, remove clothing covering the location of the cuff placement using proper blood pressure equipment and the blood pressure cuff size should be corrected with bladder encircling 80% of the arm.  Repeat blood pressure if blood pressure is extremely high.  The ideal blood pressure is less than 120/80 on the average blood pressure should be less than 130/80.  The patient will bring blood pressure log next time.        Orders:    CBC & Differential; Future    Basic Metabolic Panel; Future    Lipid Panel; Future

## 2025-05-06 NOTE — ASSESSMENT & PLAN NOTE
Continue Xarelto 10 mg once a day.  The patient is getting this for chronic therapy for unprovoked DVT/pulmonary embolism.  Orders:    CBC & Differential; Future    Basic Metabolic Panel; Future    Lipid Panel; Future

## 2025-05-06 NOTE — ASSESSMENT & PLAN NOTE
Lipid abnormalities are stable    Plan:  Continue same medication/s without change.      Discussed medication dosage, use, side effects, and goals of treatment in detail.    Counseled patient on lifestyle modifications to help control hyperlipidemia.   Advised patient to exercise for 150 minutes weekly. (30 minute brisk walk, 5 days a week for example)    Patient Treatment Goals:   LDL goal is less than 70    Followup in 3 months.  Continue rosuvastatin 10 mg once a day goal is to keep the LDL less than 70 ideally less than 55.  Orders:    CBC & Differential; Future    Basic Metabolic Panel; Future    Lipid Panel; Future

## 2025-05-06 NOTE — ASSESSMENT & PLAN NOTE
The patient has mitral regurgitation and aortic regurgitation we will repeat the echo in December.  Orders:    CBC & Differential; Future    Basic Metabolic Panel; Future    Lipid Panel; Future

## 2025-05-07 ENCOUNTER — OFFICE VISIT (OUTPATIENT)
Age: 80
End: 2025-05-07
Payer: MEDICARE

## 2025-05-07 VITALS
HEIGHT: 69 IN | DIASTOLIC BLOOD PRESSURE: 68 MMHG | WEIGHT: 142 LBS | BODY MASS INDEX: 21.03 KG/M2 | SYSTOLIC BLOOD PRESSURE: 141 MMHG | HEART RATE: 52 BPM

## 2025-05-07 DIAGNOSIS — I10 BENIGN ESSENTIAL HYPERTENSION: Primary | ICD-10-CM

## 2025-05-07 DIAGNOSIS — I34.0 NONRHEUMATIC MITRAL VALVE REGURGITATION: ICD-10-CM

## 2025-05-07 DIAGNOSIS — I27.82 CHRONIC PULMONARY EMBOLISM WITHOUT ACUTE COR PULMONALE, UNSPECIFIED PULMONARY EMBOLISM TYPE: ICD-10-CM

## 2025-05-07 DIAGNOSIS — E78.5 HYPERLIPIDEMIA LDL GOAL <70: ICD-10-CM

## 2025-05-23 RX ORDER — ROSUVASTATIN CALCIUM 10 MG/1
10 TABLET, COATED ORAL DAILY
Qty: 90 TABLET | Refills: 1 | Status: SHIPPED | OUTPATIENT
Start: 2025-05-23

## 2025-06-10 RX ORDER — EMPAGLIFLOZIN 10 MG/1
10 TABLET, FILM COATED ORAL DAILY
Qty: 90 TABLET | Refills: 1 | Status: SHIPPED | OUTPATIENT
Start: 2025-06-10

## 2025-06-10 NOTE — TELEPHONE ENCOUNTER
FAXED REQUEST  Rx Refill Note  Requested Prescriptions     Signed Prescriptions Disp Refills    Jardiance 10 MG tablet tablet 90 tablet 1     Sig: Take 1 tablet by mouth Daily.     Authorizing Provider: BERNABE NEGRETE     Ordering User: NEGRITA DEMARCO      Last office visit with prescribing clinician: 5/7/2025   Last telemedicine visit with prescribing clinician: Visit date not found   Next office visit with prescribing clinician: 12/17/2025                        Pharmacy Info    Last Fill Date:  Rx Written Date:   Prescribed Qty:   Additional Details from Pharmacy:    Patient passed protocols per [place].         Negrita Demarco MA  06/10/25, 16:36 EDT

## 2025-06-13 RX ORDER — CARVEDILOL 3.12 MG/1
3.12 TABLET ORAL EVERY 12 HOURS
Qty: 180 TABLET | Refills: 1 | Status: SHIPPED | OUTPATIENT
Start: 2025-06-13

## 2025-06-13 NOTE — TELEPHONE ENCOUNTER
Rx Refill Note  Requested Prescriptions     Pending Prescriptions Disp Refills    carvedilol (COREG) 3.125 MG tablet 180 tablet 1     Sig: Take 1 tablet by mouth Every 12 (Twelve) Hours.      Patient's wife lvm on clinical line requesting a refill on medication listed above. Per last office note, patient was to continue medication listed above. This needs to be sent [facility] on [address] in [place].    Last office visit with prescribing clinician: 5/7/2025   Last telemedicine visit with prescribing clinician: Visit date not found   Next office visit with prescribing clinician: 12/17/2025                        Would you like a call back once the refill request has been completed: [] Yes [] No [] N/A    If the office needs to give you a call back, can they leave a voicemail: [] Yes [] No [] N/A    Pharmacy Info    Last Fill Date:   Rx Written Date:   Prescribed Qty:   Additional Details from Pharmacy:       Thalia Gerardo MA  06/13/25, 10:07 EDT

## 2025-06-13 NOTE — TELEPHONE ENCOUNTER
Passed protocol. Dr. Perry is original prescriber of this medication, per Jose Daniel. Sent in rx. Patient's wife was advised mediation was sent to pharmacy, she understood.